# Patient Record
(demographics unavailable — no encounter records)

---

## 2025-03-04 NOTE — PHYSICAL EXAM
[NL] : warm, clear [FreeTextEntry5] : eyes blinking,pale and buggy mucosa b/l [de-identified] : left big toe-subungal hematoma,no swelling,no oedness

## 2025-03-04 NOTE — HISTORY OF PRESENT ILLNESS
[___ Day(s)] : [unfilled] day(s) [Intermittent] : intermittent [de-identified] : started 2 days ago eyes blinking,10 days ago something felt on her left toe